# Patient Record
Sex: MALE | Race: OTHER | NOT HISPANIC OR LATINO | Employment: FULL TIME | URBAN - METROPOLITAN AREA
[De-identification: names, ages, dates, MRNs, and addresses within clinical notes are randomized per-mention and may not be internally consistent; named-entity substitution may affect disease eponyms.]

---

## 2019-08-06 ENCOUNTER — APPOINTMENT (EMERGENCY)
Dept: RADIOLOGY | Facility: HOSPITAL | Age: 29
End: 2019-08-06

## 2019-08-06 ENCOUNTER — HOSPITAL ENCOUNTER (EMERGENCY)
Facility: HOSPITAL | Age: 29
Discharge: HOME | End: 2019-08-06
Attending: EMERGENCY MEDICINE | Admitting: EMERGENCY MEDICINE

## 2019-08-06 VITALS
RESPIRATION RATE: 18 BRPM | OXYGEN SATURATION: 100 % | DIASTOLIC BLOOD PRESSURE: 88 MMHG | TEMPERATURE: 98 F | SYSTOLIC BLOOD PRESSURE: 136 MMHG | WEIGHT: 156.97 LBS | HEART RATE: 65 BPM

## 2019-08-06 DIAGNOSIS — G89.29 CHRONIC BILATERAL LOW BACK PAIN WITHOUT SCIATICA: Primary | ICD-10-CM

## 2019-08-06 DIAGNOSIS — M25.562 CHRONIC PAIN OF BOTH KNEES: ICD-10-CM

## 2019-08-06 DIAGNOSIS — G89.29 CHRONIC PAIN OF BOTH KNEES: ICD-10-CM

## 2019-08-06 DIAGNOSIS — M25.561 CHRONIC PAIN OF BOTH KNEES: ICD-10-CM

## 2019-08-06 DIAGNOSIS — M54.50 CHRONIC BILATERAL LOW BACK PAIN WITHOUT SCIATICA: Primary | ICD-10-CM

## 2019-08-06 PROCEDURE — 99283 EMERGENCY DEPT VISIT LOW MDM: CPT | Performed by: PHYSICIAN ASSISTANT

## 2019-08-06 PROCEDURE — 99283 EMERGENCY DEPT VISIT LOW MDM: CPT

## 2019-08-06 PROCEDURE — 73560 X-RAY EXAM OF KNEE 1 OR 2: CPT

## 2019-08-06 RX ORDER — NAPROXEN 250 MG/1
375 TABLET ORAL ONCE
Status: COMPLETED | OUTPATIENT
Start: 2019-08-06 | End: 2019-08-06

## 2019-08-06 RX ORDER — LIDOCAINE 50 MG/G
1 PATCH TOPICAL ONCE
Status: DISCONTINUED | OUTPATIENT
Start: 2019-08-06 | End: 2019-08-06 | Stop reason: HOSPADM

## 2019-08-06 RX ORDER — NAPROXEN 375 MG/1
375 TABLET ORAL 2 TIMES DAILY WITH MEALS
Qty: 20 TABLET | Refills: 0 | Status: SHIPPED | OUTPATIENT
Start: 2019-08-06

## 2019-08-06 RX ADMIN — LIDOCAINE 1 PATCH: 50 PATCH TOPICAL at 14:45

## 2019-08-06 RX ADMIN — NAPROXEN 375 MG: 250 TABLET ORAL at 14:45

## 2019-08-06 NOTE — ED PROVIDER NOTES
History  Chief Complaint   Patient presents with    Knee Pain     Patient reports bilateral knee pain that has been going on for some time, states he was seen at a clinic Friday and was referred to a specialist but it is a 'long drawn out process' so he wanted to be evaluated here to see what they say; states the knees are occasionally locking up on him  Patient is a 68-year-old male with no significant past medical history who presents with chronic bilateral knee pain and chronic low back pain  Patient states he has had chronic bilateral knee pain since he was a child, which intermittently flares, most recent flare has been going on for approximately 2 weeks  He denies any swelling, erythema, numbness, tingling, weakness in his legs  He states it feels as though his knees lock  Patient also notes chronic low back pain for months, which also intermittently flares, most recent exacerbation for approximately 2 weeks  Patient describes it as an aching type feeling in his lower back, that is worse with movement, bending, twisting and lifting  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, which has caused the intermittent exacerbations  He denies any known injury or fall and states the pain does not radiate     Patient denies any numbness or tingling in his legs, saddle anesthesia, loss of bowel or bladder function  Patient has not tried anything to help alleviate his symptoms  Patient was seen at clinic 4 days ago, at which time no imaging was done and he was instructed to follow up with Ortho for chronic pain  Patient states he does not have insurance and has not followed up  Patient denies any changes symptoms, just came here to be evaluated would like to have x-rays done    Patient states he is otherwise in his usual state of health and denies any fevers, chills, diaphoresis, headache, vision changes, neck pain or stiffness, congestion, cough, shortness of breath, chest pain, abdominal pain, nausea, vomiting, diarrhea, urinary changes, or rash  None       History reviewed  No pertinent past medical history  History reviewed  No pertinent surgical history  History reviewed  No pertinent family history  I have reviewed and agree with the history as documented  Social History     Tobacco Use    Smoking status: Never Smoker    Smokeless tobacco: Never Used   Substance Use Topics    Alcohol use: Yes     Frequency: Never     Comment: occ    Drug use: Never        Review of Systems   Constitutional: Negative for chills, diaphoresis and fever  HENT: Negative for congestion and sore throat  Eyes: Negative for visual disturbance  Respiratory: Negative for cough, shortness of breath, wheezing and stridor  Cardiovascular: Negative for chest pain, palpitations and leg swelling  Gastrointestinal: Negative for abdominal pain, diarrhea, nausea and vomiting  Genitourinary: Negative for difficulty urinating, dysuria, flank pain and hematuria  Musculoskeletal: Positive for arthralgias and back pain  Negative for joint swelling, myalgias, neck pain and neck stiffness  Skin: Negative for color change, pallor and rash  Neurological: Negative for dizziness, weakness, light-headedness, numbness and headaches  All other systems reviewed and are negative  Physical Exam  Physical Exam   Constitutional: He is oriented to person, place, and time  Vital signs are normal  He appears well-developed and well-nourished  He is active and cooperative  Non-toxic appearance  He does not have a sickly appearance  He does not appear ill  No distress  Patient appears well, no acute distress, nontoxic-appearing  He is able ambulating in ED without issue   HENT:   Head: Normocephalic and atraumatic     Right Ear: Hearing, tympanic membrane, external ear and ear canal normal    Left Ear: Hearing, tympanic membrane, external ear and ear canal normal    Nose: Nose normal    Mouth/Throat: Oropharynx is clear and moist    Eyes: Pupils are equal, round, and reactive to light  Conjunctivae and EOM are normal    Neck: Normal range of motion  Neck supple  Cardiovascular: Normal rate, regular rhythm, S1 normal, S2 normal, normal heart sounds, intact distal pulses and normal pulses  Pulses:       Radial pulses are 2+ on the right side, and 2+ on the left side  Posterior tibial pulses are 2+ on the right side, and 2+ on the left side  Pulmonary/Chest: Effort normal and breath sounds normal  No stridor  No respiratory distress  He has no wheezes  Abdominal: Soft  Bowel sounds are normal  He exhibits no distension  There is no tenderness  Musculoskeletal: Normal range of motion  Right knee: Normal  He exhibits normal range of motion, no swelling, no effusion, no ecchymosis, no deformity, no laceration, no erythema, normal alignment, no LCL laxity, no bony tenderness, normal meniscus and no MCL laxity  No tenderness found  Left knee: Normal  He exhibits normal range of motion, no swelling, no effusion, no ecchymosis, no deformity, no laceration, no erythema, normal alignment, no LCL laxity, no bony tenderness, normal meniscus and no MCL laxity  No tenderness found  Cervical back: Normal         Thoracic back: Normal         Lumbar back: He exhibits tenderness (Over muscles as indicated)  He exhibits normal range of motion, no bony tenderness, no swelling, no edema, no deformity, no laceration, no pain, no spasm and normal pulse  Back:    Neurovascular intact, 5/5 strength in bilateral lower extremities  Full range of motion in back and knees  Negative straight leg raise  Lymphadenopathy:     He has no cervical adenopathy  Neurological: He is alert and oriented to person, place, and time  He has normal strength  Skin: Skin is warm and dry  Capillary refill takes less than 2 seconds  He is not diaphoretic  Nursing note and vitals reviewed        Vital Signs  ED Triage Vitals [08/06/19 1354]   Temperature Pulse Respirations Blood Pressure SpO2   98 °F (36 7 °C) 65 18 155/100 100 %      Temp Source Heart Rate Source Patient Position - Orthostatic VS BP Location FiO2 (%)   Temporal Monitor Sitting Right arm --      Pain Score       6           Vitals:    08/06/19 1354 08/06/19 1454   BP: 155/100 136/88   Pulse: 65    Patient Position - Orthostatic VS: Sitting Sitting         Visual Acuity      ED Medications  Medications   naproxen (NAPROSYN) tablet 375 mg (375 mg Oral Given 8/6/19 1445)       Diagnostic Studies  Results Reviewed     None                 XR knee 1 or 2 vw left   ED Interpretation by Kelly Chappell PA-C (08/06 1501)   No acute pathology noted      Final Result by Victor Manuel Artis MD (08/06 1627)      No acute osseous abnormality  Workstation performed: [id number]         XR knee 1 or 2 vw right   ED Interpretation by Kelly Chappell PA-C (08/06 1501)   No acute pathology noted      Final Result by Greta Moses MD (08/06 1630)      No acute osseous abnormality  Workstation performed: [id number]                    Procedures  Procedures       ED Course                               MDM  Number of Diagnoses or Management Options  Chronic bilateral low back pain without sciatica:   Chronic pain of both knees:   Diagnosis management comments: Patient did not want any injections for medications and did not want to try any muscle relaxants  Patient insisting on x-rays, discussed risks and benefits  Reviewed results of x-ray, no acute pathology noted  Informed that we will call if radiology notes any significant findings  Discussed likely muscular origin of low back pain and chronic nature of patient's symptoms, which will require follow-up outpatient  Symptoms likely aggravated by patient's work  Provided with naproxen and reviewed symptomatic treatment at home  Provided with [employer] to get connected with insurance    Recommended follow-up with Sports Medicine as soon as possible for further evaluation of chronic knee pain and low back pain  Reviewed red flags symptoms and return to ED instructions  Patient notes understanding and agrees to plan  Disposition  Final diagnoses:   Chronic bilateral low back pain without sciatica   Chronic pain of both knees     Time reflects when diagnosis was documented in both MDM as applicable and the Disposition within this note     Time User Action Codes Description Comment    8/6/2019  3:06 PM Mittie Hedger Add [V24 846,  M25 562] Acute pain of both knees     8/6/2019  3:07 PM Costlow, Rodrigo Guitar Add [M54 5,  G89 29] Chronic bilateral low back pain without sciatica     8/6/2019  3:07 PM Mittie Hedger Modify [U27 7,  G89 29] Chronic bilateral low back pain without sciatica     8/6/2019  3:07 PM Melania Fails [K38 184,  M25 562] Acute pain of both knees     8/6/2019  3:07 PM Costlow, Rodrigo Guitar Add [M25 561,  M25 562,  G89 29] Chronic pain of both knees       ED Disposition     ED Disposition Condition Date/Time Comment    Discharge Stable Tue Aug 6, 2019  3:05 PM Priyanka Castillo discharge to home/self care  Follow-up Information     Follow up With Specialties Details Why [address] [facility] Emergency Department Emergency Medicine  If symptoms worsen [place] [id number] [phone] [place] [facility], [address] [facility] [address]  Call today  [phone] [address]  Schedule an appointment as soon as possible for a visit   [place]  [phone]           Discharge Medication List as of 8/6/2019  3:12 PM      START taking these medications    Details   naproxen (NAPROSYN) 375 mg tablet Take 1 tablet (375 mg total) by mouth 2 (two) times a day with meals, Starting Tue 8/6/2019, Print           No discharge procedures on file      ED Provider  Electronically Signed by           Chapo Edge DARA Dubois  08/06/19 1737       Avila Peterson PA-C  08/06/19 1732